# Patient Record
Sex: FEMALE | Race: WHITE | NOT HISPANIC OR LATINO | Employment: UNEMPLOYED | URBAN - METROPOLITAN AREA
[De-identification: names, ages, dates, MRNs, and addresses within clinical notes are randomized per-mention and may not be internally consistent; named-entity substitution may affect disease eponyms.]

---

## 2023-09-20 ENCOUNTER — OFFICE VISIT (OUTPATIENT)
Dept: MEDICAL GROUP | Facility: LAB | Age: 38
End: 2023-09-20
Payer: MEDICARE

## 2023-09-20 VITALS
SYSTOLIC BLOOD PRESSURE: 106 MMHG | DIASTOLIC BLOOD PRESSURE: 68 MMHG | WEIGHT: 195.8 LBS | HEIGHT: 67 IN | HEART RATE: 90 BPM | BODY MASS INDEX: 30.73 KG/M2 | OXYGEN SATURATION: 96 % | TEMPERATURE: 97.1 F | RESPIRATION RATE: 14 BRPM

## 2023-09-20 DIAGNOSIS — G12.21 ALS (AMYOTROPHIC LATERAL SCLEROSIS) (HCC): ICD-10-CM

## 2023-09-20 DIAGNOSIS — N64.4 BREAST PAIN: ICD-10-CM

## 2023-09-20 DIAGNOSIS — K90.0 CELIAC DISEASE: ICD-10-CM

## 2023-09-20 DIAGNOSIS — Z00.00 PREVENTATIVE HEALTH CARE: ICD-10-CM

## 2023-09-20 PROCEDURE — 99204 OFFICE O/P NEW MOD 45 MIN: CPT | Performed by: NURSE PRACTITIONER

## 2023-09-20 PROCEDURE — 3074F SYST BP LT 130 MM HG: CPT | Performed by: NURSE PRACTITIONER

## 2023-09-20 PROCEDURE — 3078F DIAST BP <80 MM HG: CPT | Performed by: NURSE PRACTITIONER

## 2023-09-20 RX ORDER — MEXILETINE HYDROCHLORIDE 150 MG/1
CAPSULE ORAL
COMMUNITY
Start: 2023-09-18

## 2023-09-20 RX ORDER — RILUZOLE 50 MG/1
TABLET, FILM COATED ORAL
COMMUNITY
Start: 2023-06-27

## 2023-09-20 ASSESSMENT — PATIENT HEALTH QUESTIONNAIRE - PHQ9: CLINICAL INTERPRETATION OF PHQ2 SCORE: 0

## 2023-09-20 NOTE — ASSESSMENT & PLAN NOTE
Followed by neurology at [facility]. Taking medication as prescribed. Started mexiletine last night and has noticed an improvement in muscle cramping. Follow up with neurology is in a year.

## 2023-09-20 NOTE — PROGRESS NOTES
"Subjective:     CC:    Chief Complaint   Patient presents with    Establish Care    Breast Problem     L breast itchy / wants mammogram      HISTORY OF THE PRESENT ILLNESS: Patient is a 38 y.o. female. This pleasant patient is here today to establish care and discuss the following:    Breast itching  Reports ongoing itching sensation in the left breast for the few weeks. Denies skin changes, masses/lumps, nipple discharge or inversion, fever, chills, unintentional weight loss.     ALS (amyotrophic lateral sclerosis) ([facility])  Followed by neurology at [facility]. Taking medication as prescribed. Started mexiletine last night and has noticed an improvement in muscle cramping. Follow up with neurology is in a year.     Celiac disease  Chronic condition, controlled with diet. No concerns.     ROS:   Gen: no fevers/chills, no changes in weight  Eyes: no changes in vision  ENT: no sore throat, no hearing loss, no bloody nose  Pulm: no sob, no cough  CV: no chest pain, no palpitations  GI: no nausea/vomiting, no diarrhea  : no dysuria  MSk: no myalgias  Skin: no rash  Neuro: no headaches, no numbness/tingling  Heme/Lymph: no easy bruising        - NOTE: All other systems reviewed and are negative, except as in HPI.      Objective:     Exam: /68 (BP Location: Right arm, Patient Position: Sitting, BP Cuff Size: Adult)   Pulse 90   Temp 36.2 °C (97.1 °F)   Resp 14   Ht 1.702 m (5' 7\")   Wt 88.8 kg (195 lb 12.8 oz)   SpO2 96%  Body mass index is 30.67 kg/m².    Constitutional: Alert, no distress, well-groomed.  Skin: Warm, dry, good turgor, no rashes in visible areas.  Eye: Equal, round and reactive, conjunctiva clear, lids normal.  ENMT: Lips without lesions, good dentition, moist mucous membranes.  Neck: Trachea midline, no masses, no thyromegaly.  Respiratory: Unlabored respiratory effort, no cough. Clear to ausculation. No rales, ronchi, or wheezing.  Cardiovascular: Regular rate and rhythm without murmur. Carotid and " radial pulses are intact and equal bilaterally.  MSK: Normal gait, moves all extremities.  Neuro: Grossly non-focal.   Psych: Alert and oriented x3, normal affect and mood.    Assessment & Plan:   38 y.o. female with the following -    1. Breast pain  Mammogram ordered.   - MA-DIAGNOSTIC MAMMO BILAT W/TOMOSYNTHESIS W/CAD; Future    2. ALS (amyotrophic lateral sclerosis) ([facility])  Chronic condition. Continue to follow up with neurology. Continue medications as prescribed.     3. Celiac disease  Chronic, stable condition, controlled with diet.     4. Preventative health care  Labs ordered.   - CBC WITHOUT DIFFERENTIAL; Future  - Comp Metabolic Panel; Future  - Lipid Profile; Future  - HEMOGLOBIN A1C; Future  - TSH WITH REFLEX TO FT4; Future

## 2023-09-22 ENCOUNTER — HOSPITAL ENCOUNTER (OUTPATIENT)
Dept: LAB | Facility: MEDICAL CENTER | Age: 38
End: 2023-09-22
Attending: NURSE PRACTITIONER
Payer: MEDICARE

## 2023-09-22 DIAGNOSIS — Z00.00 PREVENTATIVE HEALTH CARE: ICD-10-CM

## 2023-09-26 ENCOUNTER — HOSPITAL ENCOUNTER (OUTPATIENT)
Dept: RADIOLOGY | Facility: MEDICAL CENTER | Age: 38
End: 2023-09-26
Attending: NURSE PRACTITIONER
Payer: OTHER GOVERNMENT

## 2023-09-26 DIAGNOSIS — N64.4 BREAST PAIN: ICD-10-CM

## 2023-09-26 PROCEDURE — 76642 ULTRASOUND BREAST LIMITED: CPT | Mod: RT

## 2023-09-26 PROCEDURE — G0279 TOMOSYNTHESIS, MAMMO: HCPCS

## 2024-06-21 ENCOUNTER — OFFICE VISIT (OUTPATIENT)
Dept: SPORTS MEDICINE | Facility: OTHER | Age: 39
End: 2024-06-21
Payer: MEDICARE

## 2024-06-21 ENCOUNTER — APPOINTMENT (OUTPATIENT)
Dept: RADIOLOGY | Facility: OTHER | Age: 39
End: 2024-06-21
Attending: FAMILY MEDICINE
Payer: OTHER GOVERNMENT

## 2024-06-21 VITALS
OXYGEN SATURATION: 97 % | TEMPERATURE: 97.6 F | DIASTOLIC BLOOD PRESSURE: 80 MMHG | HEART RATE: 79 BPM | SYSTOLIC BLOOD PRESSURE: 110 MMHG | WEIGHT: 180 LBS | BODY MASS INDEX: 28.25 KG/M2 | RESPIRATION RATE: 16 BRPM | HEIGHT: 67 IN

## 2024-06-21 DIAGNOSIS — M25.561 ACUTE PAIN OF RIGHT KNEE: ICD-10-CM

## 2024-06-21 PROCEDURE — 99214 OFFICE O/P EST MOD 30 MIN: CPT | Performed by: FAMILY MEDICINE

## 2024-06-21 PROCEDURE — 73564 X-RAY EXAM KNEE 4 OR MORE: CPT | Mod: TC,FY,RT | Performed by: FAMILY MEDICINE

## 2024-06-21 PROCEDURE — 3074F SYST BP LT 130 MM HG: CPT | Performed by: FAMILY MEDICINE

## 2024-06-21 PROCEDURE — 3079F DIAST BP 80-89 MM HG: CPT | Performed by: FAMILY MEDICINE

## 2024-06-21 NOTE — PROGRESS NOTES
"Chief Complaint   Patient presents with    Knee Injury     Fell on May 29 and is having continued right knee pain     CHIEF COMPLAINT:  Teri Vega female presenting at the request of Self-referred for evaluation of knee pain.     Teri Vega is complaining of right knee pain  Tripped on sidewalk and fell forward onto flexed knees  Date of injury, May 29, 2024  Pain is at the anterior knee  Quality is numbness, sharp  Pain is non-radiating   Improved with resting and avoiding pressure to the anterior knee region  POSITIVE night symptoms when her RIGHT knee rubs up against her bed sheets  Aggravated by pressure to the area  no prior problems with this area in the past   Prior Treatments:  none  Prior studies: NO Prior imaging has been done   Medications tried for pain include: ibuprofen (OTC) which hasn't helped much  Mechanical Symptom history: No Locking    Baseline ALS (poor balance)  Raising children, outdoor activities    REVIEW OF SYSTEMS  No Nausea, No Vomiting, No Chest Pain, No Shortness of Breath, No Dizziness, No Headache      PAST MEDICAL HISTORY:   History reviewed. No pertinent past medical history.    PMH:  has no past medical history on file.  MEDS:   Current Outpatient Medications:     mexiletine (MEXITIL) 150 MG Cap, , Disp: , Rfl:     riluzole (RILUTEK) 50 MG tablet, , Disp: , Rfl:   ALLERGIES: No Known Allergies  SURGHX:   Past Surgical History:   Procedure Laterality Date    SEPTOPLASTY       SOCHX:  reports that she has never smoked. She has never used smokeless tobacco. She reports that she does not currently use alcohol. She reports that she does not currently use drugs.  FH: Family history was reviewed, no pertinent findings to report     PHYSICAL EXAM:  /80 (BP Location: Right arm, Patient Position: Sitting, BP Cuff Size: Adult)   Pulse 79   Temp 36.4 °C (97.6 °F) (Temporal)   Resp 16   Ht 1.702 m (5' 7\")   Wt 81.6 kg (180 lb)   SpO2 97%   BMI 28.19 kg/m² "      well-developed, well-nourished in no apparent distress, alert and oriented x 3.  Gait: normal     RIGHT Knee:  Slight Varus and No Swelling  Range of Motion Intact  Trace effusion  Patellar  tenderness with hyperalgesia with soft touch and particularly tender with POSITIVE Tinel's along the medial inferior portion of the peripatellar region  Medial Joint Line Non-tender and NEGATIVE Maude  Lateral Joint Line Non-tender and NEGATIVE Maude  Trace Laxity with Varus stress  Trace Laxity with Valgus stress  Lachman's testing is Trace  Posterior Drawer Testing is Trace  The leg is otherwise neurovascularly intact    LEFT Knee:  Slight Varus and No Swelling   Range of Motion Intact  Trace effusion  Patellar No tenderness and no apprehension  Medial Joint Line Non-tender and NEGATIVE Maude  Lateral Joint Line Non-tender and NEGATIVE Maude  Trace Laxity with Varus stress  Trace Laxity with Valgus stress  Lachman's testing is Trace  Posterior Drawer Testing is Trace  The leg is otherwise neurovascularly intact    Additional Findings: None  Some overall dystonic spasticity    1. Acute pain of right knee  DX-KNEE COMPLETE 4+ RIGHT        Tripped on sidewalk and fell forward onto flexed knees  Date of injury, May 29, 2024  Pain is at the anterior knee    Saphenous nerve neuritis, infrapatellar  Recommended self massage  Try Voltaren gel    Consider referral for saphenous nerve block if symptoms persist    No follow-ups on file.                      6/21/2024 9:25 AM     HISTORY/REASON FOR EXAM:  Right knee pain after trauma     TECHNIQUE/EXAM DESCRIPTION AND NUMBER OF VIEWS:  4 views of the RIGHT knee.     COMPARISON: None     FINDINGS:     BONE MINERALIZATION: Normal.  JOINTS: Preserved. No erosions.  FRACTURE: None.  DISLOCATION: None.  SOFT TISSUES: No mass.     IMPRESSION:     No fracture or dislocation.           Exam Ended: 06/21/24  9:34 AM Last Resulted: 06/21/24  9:46 AM        Interpreted in the office  today with the patient    Self-referred